# Patient Record
(demographics unavailable — no encounter records)

---

## 2025-06-02 NOTE — HISTORY OF PRESENT ILLNESS
[FreeTextEntry1] : Mr. Hamilton is a very pleasant 56 -year-old gentleman with history of diabetes, hypertension, dyslipidemia, obesity, sleep apnea syndrome with improved symptoms after weight loss ;  by profession, came for cardiac   follow up.   He c/o SOB on more than usual exertion,; he denies PND, orthopnea, diaphoresis, dizziness, palpitation, pedal edema.  He is very compliant medications and low-cholesterol diet.  He is able to maintain his weight loss.  His axilla pain is gone  June 2, 2025    EKG confirmed sinus bradycardia, nonischemic EKG May 15, 2025 A1c 5.9, CBC normal, CMP normal May 15, 2023   echocardiogram showed normal size, LV thickness, wall motion, LVEF 65% with normal valvular morphology. June 28, 2023   nuclear stress test was done using [facility] protocol; he exercised for 12 minutes with peak heart rate 141 bpm, peak blood pressure 154/82 mmHg; marker perfusion scan did not show inducible ischemia  He was also treated for Lyme's disease.   He is very compliant to all medications including metformin, unfortunately he does not follow diet well

## 2025-06-02 NOTE — ASSESSMENT
[FreeTextEntry1] : Dyslipidemia, on Lipitor and Zetia, tolerating very well. Lipid profile meeting NCEP goal.  Hypertension, well controlled. He is avoiding salt. He understands the importance of controlled hypertension to prevent end-organ damage. BP monitoring.  Overweight status. He understands weight reduction with diet and exercise.  Obstructive sleep apnea syndrome -not go for sleep study and now he has improved symptoms after weight loss.  SOB with multiple CAD risk factors, negative non invasive CAD work up. Echo confirmed normal LV size, wall motion, LVEF and exercise nuclear stress test did not show any inducible ischemia, no evidence of CAD  Diabetes - diabetic diet has been discussed with him. Continue Metformin 500 mg daily. Long-term goals blood pressure less than 130/80, A1c less than 7, LDL 70; diabetic diet; continue current medications.  Bradycardia -candidate because of medications or sleep apnea syndrome.  I have recommended heart monitor for 7 days.  Repeat sleep study has been recommended.  Aggressive risk factor modification has been discussed with him at a great length including regular walking, compliance to medication, and low-cholesterol diet.  Primary preventive care also has been discussed with him at a great length. He will be reevaluated by me in 6 months after lab test.  Scopolamine patch has been given to him for his upcoming cruise trip .

## 2025-06-02 NOTE — PHYSICAL EXAM
[General Appearance - Well Developed] : well developed [Normal Appearance] : normal appearance [Well Groomed] : well groomed [General Appearance - Well Nourished] : well nourished [No Deformities] : no deformities [General Appearance - In No Acute Distress] : no acute distress [Normal Conjunctiva] : the conjunctiva exhibited no abnormalities [Eyelids - No Xanthelasma] : the eyelids demonstrated no xanthelasmas [Normal Oral Mucosa] : normal oral mucosa [No Oral Pallor] : no oral pallor [No Oral Cyanosis] : no oral cyanosis [Respiration, Rhythm And Depth] : normal respiratory rhythm and effort [Exaggerated Use Of Accessory Muscles For Inspiration] : no accessory muscle use [Auscultation Breath Sounds / Voice Sounds] : lungs were clear to auscultation bilaterally [Heart Rate And Rhythm] : heart rate and rhythm were normal [Heart Sounds] : normal S1 and S2 [Murmurs] : no murmurs present [Abdomen Soft] : soft [Abdomen Tenderness] : non-tender [Abnormal Walk] : normal gait [Abdomen Mass (___ Cm)] : no abdominal mass palpated [Gait - Sufficient For Exercise Testing] : the gait was sufficient for exercise testing [Nail Clubbing] : no clubbing of the fingernails [Cyanosis, Localized] : no localized cyanosis [Petechial Hemorrhages (___cm)] : no petechial hemorrhages [Skin Color & Pigmentation] : normal skin color and pigmentation [] : no rash [No Venous Stasis] : no venous stasis [Skin Lesions] : no skin lesions [No Skin Ulcers] : no skin ulcer [No Xanthoma] : no  xanthoma was observed [Oriented To Time, Place, And Person] : oriented to person, place, and time [Affect] : the affect was normal [Mood] : the mood was normal [No Anxiety] : not feeling anxious